# Patient Record
Sex: FEMALE | Race: WHITE | NOT HISPANIC OR LATINO | Employment: FULL TIME | URBAN - METROPOLITAN AREA
[De-identification: names, ages, dates, MRNs, and addresses within clinical notes are randomized per-mention and may not be internally consistent; named-entity substitution may affect disease eponyms.]

---

## 2017-02-08 ENCOUNTER — OFFICE VISIT (OUTPATIENT)
Dept: NEUROSURGERY | Facility: CLINIC | Age: 47
End: 2017-02-08

## 2017-02-08 VITALS
BODY MASS INDEX: 29.71 KG/M2 | HEART RATE: 80 BPM | WEIGHT: 174 LBS | SYSTOLIC BLOOD PRESSURE: 116 MMHG | TEMPERATURE: 98.2 F | DIASTOLIC BLOOD PRESSURE: 80 MMHG | HEIGHT: 64 IN

## 2017-02-08 DIAGNOSIS — M51.04 HNP (HERNIATED NUCLEUS PULPOSUS WITH MYELOPATHY), THORACIC: ICD-10-CM

## 2017-02-08 DIAGNOSIS — M54.50 CHRONIC BILATERAL LOW BACK PAIN WITHOUT SCIATICA: ICD-10-CM

## 2017-02-08 DIAGNOSIS — M51.04 INTERVERTEBRAL THORACIC DISC DISORDER WITH MYELOPATHY, THORACIC REGION: ICD-10-CM

## 2017-02-08 DIAGNOSIS — M48.061 LUMBAR STENOSIS: ICD-10-CM

## 2017-02-08 DIAGNOSIS — G89.29 CHRONIC BILATERAL LOW BACK PAIN WITHOUT SCIATICA: ICD-10-CM

## 2017-02-08 DIAGNOSIS — M54.16 SPINAL STENOSIS OF LUMBAR REGION WITH RADICULOPATHY: ICD-10-CM

## 2017-02-08 DIAGNOSIS — M25.551 RIGHT HIP PAIN: ICD-10-CM

## 2017-02-08 DIAGNOSIS — IMO0002 HNP (HERNIATED NUCLEUS PULPOSUS): ICD-10-CM

## 2017-02-08 DIAGNOSIS — M47.817 LUMBOSACRAL SPONDYLOSIS WITHOUT MYELOPATHY: ICD-10-CM

## 2017-02-08 DIAGNOSIS — M25.551 ARTHRALGIA OF RIGHT HIP: Primary | ICD-10-CM

## 2017-02-08 DIAGNOSIS — M54.16 LUMBAR RADICULOPATHY: ICD-10-CM

## 2017-02-08 DIAGNOSIS — M48.061 SPINAL STENOSIS OF LUMBAR REGION WITH RADICULOPATHY: ICD-10-CM

## 2017-02-08 PROCEDURE — 99213 OFFICE O/P EST LOW 20 MIN: CPT | Performed by: NEUROLOGICAL SURGERY

## 2017-02-08 RX ORDER — GABAPENTIN 300 MG/1
300 CAPSULE ORAL 3 TIMES DAILY
Qty: 90 CAPSULE | Refills: 3 | Status: SHIPPED | OUTPATIENT
Start: 2017-02-08 | End: 2017-03-15

## 2017-02-08 RX ORDER — HYDROCODONE BITARTRATE AND ACETAMINOPHEN 10; 325 MG/1; MG/1
1 TABLET ORAL EVERY 6 HOURS PRN
Qty: 120 TABLET | Refills: 0 | Status: SHIPPED | OUTPATIENT
Start: 2017-02-08 | End: 2017-03-29 | Stop reason: SDUPTHER

## 2017-02-08 NOTE — PROGRESS NOTES
Patient: Xenia Salas  :  1970  Chart #:  [id number]    Date of Service: 17    Chief Complaint:   Chief Complaint   Patient presents with   • Hip Pain   • Back Pain       Hip Pain    The pain is present in the right hip. The quality of the pain is described as stabbing and aching (when it occurs). The pain is at a severity of 5/10. The pain has been improving since onset. Pertinent negatives include no muscle weakness, numbness or tingling. The symptoms are aggravated by movement and weight bearing. She has tried heat and rest for the symptoms. The treatment provided moderate relief.   Back Pain   This is a chronic problem. The current episode started more than 1 month ago. The problem occurs intermittently. The problem has been gradually improving since onset. The quality of the pain is described as stabbing and aching (when it occurs). The pain does not radiate. The pain is at a severity of 5/10. The pain is mild. Pertinent negatives include no abdominal pain, chest pain, dysuria, fever, headaches, numbness, tingling or weakness. She has tried heat, muscle relaxant and NSAIDs for the symptoms. The treatment provided moderate relief.     She has variable intensity pain in the R buttock;  She has little low back pain;  She is exercising regularly; she takes relafen and hydrocodone.    Radiographic Images:   None recently;  Last thoracic MRI done 16.    Past Medical History   Diagnosis Date   • Anxiety and depression    • Arthritis    • Back problem    • Bladder infection    • Disease of thyroid gland    • Inguinal hernia    • Migraine headache    • PONV (postoperative nausea and vomiting)    • Wears glasses    • Worsening headaches      Current Outpatient Prescriptions   Medication Sig Dispense Refill   • Cetirizine HCl (ZYRTEC ALLERGY) 10 MG capsule Take 10 mg by mouth daily.     • DULoxetine (CYMBALTA) 30 MG capsule Take 30 mg by mouth daily.     • HYDROcodone-acetaminophen (NORCO)   MG per tablet Take 1 tablet by mouth Every 6 (Six) Hours As Needed for moderate pain (4-6). 120 tablet 0   • levothyroxine (SYNTHROID, LEVOTHROID) 112 MCG tablet Take 112 mcg by mouth daily.     • nabumetone (RELAFEN) 750 MG tablet Take 1 tablet by mouth 2 (Two) Times a Day. With food 60 tablet 3   • SUMAtriptan (IMITREX) 100 MG tablet Take 100 mg by mouth every 2 (two) hours as needed for migraine.     • ZOLMitriptan (ZOMIG) 5 MG nasal solution 1 spray into each nostril as needed.     • ZOLMitriptan (ZOMIG) 5 MG tablet Take 5 mg by mouth 1 (one) time as needed for migraine.     • gabapentin (NEURONTIN) 300 MG capsule Take 1 capsule by mouth 3 (Three) Times a Day. Start at 1 po QHS week 1. Increase as needed to bid week 2 then tid week 3 as tolerated. 90 capsule 3     No current facility-administered medications for this visit.      Social History     Social History   • Marital status:      Spouse name: N/A   • Number of children: N/A   • Years of education: N/A     Social History Main Topics   • Smoking status: Former Smoker     Packs/day: 1.00     Years: 10.00     Types: Cigarettes     Quit date: 2005   • Smokeless tobacco: Never Used   • Alcohol use Yes      Comment: Moderately   • Drug use: No   • Sexual activity: Not Asked     Other Topics Concern   • None     Social History Narrative     History reviewed. No pertinent family history.  Past Surgical History   Procedure Laterality Date   • Thoracic discectomy  2014     T10-T11 Disc and fusion with cage and plating.   •  section  2001        • Tubal abdominal ligation  2001        • Tonsillectomy  1988   • Bunionectomy  2008   • Thyroidectomy     • Hernia repair     • Umbilical hernia repair     • Lumbar laminectomy discectomy decompression Right 2016     Procedure:  L4-5 LAMINECTOMY ;  Surgeon: Bonifacio Cuellar MD;  Location: [facility];  Service:      Review of Systems   Constitutional: Negative for  "activity change, appetite change, chills, diaphoresis, fatigue, fever and unexpected weight change.   HENT: Negative for congestion, dental problem, drooling, ear discharge, ear pain, facial swelling, hearing loss, mouth sores, nosebleeds, postnasal drip, rhinorrhea, sinus pressure, sneezing, sore throat, tinnitus, trouble swallowing and voice change.    Eyes: Negative for photophobia, pain, discharge, redness, itching and visual disturbance.   Respiratory: Negative for apnea, cough, choking, chest tightness, shortness of breath, wheezing and stridor.    Cardiovascular: Negative for chest pain, palpitations and leg swelling.   Gastrointestinal: Negative for abdominal distention, abdominal pain, anal bleeding, blood in stool, constipation, diarrhea, nausea, rectal pain and vomiting.   Endocrine: Negative for cold intolerance, heat intolerance, polydipsia, polyphagia and polyuria.   Genitourinary: Negative for decreased urine volume, difficulty urinating, dysuria, enuresis, flank pain, frequency, genital sores, hematuria and urgency.   Musculoskeletal: Negative for arthralgias, back pain, gait problem, joint swelling, myalgias, neck pain and neck stiffness.   Skin: Negative for color change, pallor, rash and wound.   Allergic/Immunologic: Negative for environmental allergies, food allergies and immunocompromised state.   Neurological: Negative for dizziness, tingling, tremors, seizures, syncope, facial asymmetry, speech difficulty, weakness, light-headedness, numbness and headaches.   Hematological: Negative for adenopathy. Does not bruise/bleed easily.   Psychiatric/Behavioral: Negative for agitation, behavioral problems, confusion, decreased concentration, dysphoric mood, hallucinations, self-injury, sleep disturbance and suicidal ideas. The patient is not nervous/anxious and is not hyperactive.      Vitals:    02/08/17 0753   BP: 116/80   Pulse: 80   Temp: 98.2 °F (36.8 °C)   Weight: 174 lb (78.9 kg)   Height: 64\" " (162.6 cm)     Physical Exam  Neurologic Exam  Constitutional: She is oriented to person, place, and time. She appears well-developed and well-nourished. No distress.   Neat healthy female    Musculoskeletal:   Lumbar back: She exhibits pain. She exhibits normal range of motion, no tenderness, no deformity and no spasm.   Healed lumbar and thoracic incisions; Minimal stiffness; SLR negative; Hip ROM normal    Mental Status   Oriented to person, place, and time.   Attention: normal. Concentration: normal.   Speech: speech is normal   Level of consciousness: alert  Knowledge: good and consistent with education.   Normal comprehension.      Cranial Nerves   Cranial nerves II through XII intact.      Motor Exam   Muscle bulk: normal  Overall muscle tone: normal     Strength   Strength 5/5 throughout.      Sensory Exam   Right arm light touch: normal  Left arm light touch: normal  Right leg light touch: decreased from knee  Left leg light touch: normal  Proprioception normal.      Gait, Coordination, and Reflexes      Reflexes   Right biceps: 2+  Left biceps: 2+  Right triceps: 2+  Left triceps: 2+  Right patellar: 2+  Left patellar: 2+  Right achilles: 2+  Left achilles: 2+  Right Aguero: absent  Left Aguero: absent  Right ankle clonus: absent  Left ankle clonus: absent  NASIR normal      Xenia was seen today for hip pain and back pain.    Diagnoses and all orders for this visit:    Arthralgia of right hip  -     gabapentin (NEURONTIN) 300 MG capsule; Take 1 capsule by mouth 3 (Three) Times a Day. Start at 1 po QHS week 1. Increase as needed to bid week 2 then tid week 3 as tolerated.    Chronic bilateral low back pain without sciatica  -     gabapentin (NEURONTIN) 300 MG capsule; Take 1 capsule by mouth 3 (Three) Times a Day. Start at 1 po QHS week 1. Increase as needed to bid week 2 then tid week 3 as tolerated.  -     HYDROcodone-acetaminophen (NORCO)  MG per tablet; Take 1 tablet by mouth Every 6 (Six)  Hours As Needed for moderate pain (4-6).    HNP (herniated nucleus pulposus with myelopathy), thoracic  -     HYDROcodone-acetaminophen (NORCO)  MG per tablet; Take 1 tablet by mouth Every 6 (Six) Hours As Needed for moderate pain (4-6).    Spinal stenosis of lumbar region with radiculopathy  -     HYDROcodone-acetaminophen (NORCO)  MG per tablet; Take 1 tablet by mouth Every 6 (Six) Hours As Needed for moderate pain (4-6).    Lumbar radiculopathy  -     HYDROcodone-acetaminophen (NORCO)  MG per tablet; Take 1 tablet by mouth Every 6 (Six) Hours As Needed for moderate pain (4-6).    Right hip pain  -     HYDROcodone-acetaminophen (NORCO)  MG per tablet; Take 1 tablet by mouth Every 6 (Six) Hours As Needed for moderate pain (4-6).    HNP (herniated nucleus pulposus)  -     HYDROcodone-acetaminophen (NORCO)  MG per tablet; Take 1 tablet by mouth Every 6 (Six) Hours As Needed for moderate pain (4-6).    Lumbar stenosis  -     HYDROcodone-acetaminophen (NORCO)  MG per tablet; Take 1 tablet by mouth Every 6 (Six) Hours As Needed for moderate pain (4-6).    Intervertebral thoracic disc disorder with myelopathy, thoracic region  -     HYDROcodone-acetaminophen (NORCO)  MG per tablet; Take 1 tablet by mouth Every 6 (Six) Hours As Needed for moderate pain (4-6).    Lumbosacral spondylosis without myelopathy  -     HYDROcodone-acetaminophen (NORCO)  MG per tablet; Take 1 tablet by mouth Every 6 (Six) Hours As Needed for moderate pain (4-6).        Plan: renew hydrocodone;  Continue relafen;  Add gabapentin for sciatic pain;  Continue exercise;  Return in 4 - 6 weeks for re-evaluation.      Bonifacio Cuellar MD

## 2017-02-15 DIAGNOSIS — M47.817 LUMBOSACRAL SPONDYLOSIS WITHOUT MYELOPATHY: ICD-10-CM

## 2017-02-15 DIAGNOSIS — M54.50 CHRONIC BILATERAL LOW BACK PAIN WITHOUT SCIATICA: ICD-10-CM

## 2017-02-15 DIAGNOSIS — M48.061 LUMBAR STENOSIS: ICD-10-CM

## 2017-02-15 DIAGNOSIS — G89.29 CHRONIC BILATERAL LOW BACK PAIN WITHOUT SCIATICA: ICD-10-CM

## 2017-02-15 DIAGNOSIS — M54.16 SPINAL STENOSIS OF LUMBAR REGION WITH RADICULOPATHY: ICD-10-CM

## 2017-02-15 DIAGNOSIS — M51.04 HNP (HERNIATED NUCLEUS PULPOSUS WITH MYELOPATHY), THORACIC: ICD-10-CM

## 2017-02-15 DIAGNOSIS — IMO0002 HNP (HERNIATED NUCLEUS PULPOSUS): ICD-10-CM

## 2017-02-15 DIAGNOSIS — M25.551 RIGHT HIP PAIN: ICD-10-CM

## 2017-02-15 DIAGNOSIS — M51.04 INTERVERTEBRAL THORACIC DISC DISORDER WITH MYELOPATHY, THORACIC REGION: ICD-10-CM

## 2017-02-15 DIAGNOSIS — M54.16 LUMBAR RADICULOPATHY: ICD-10-CM

## 2017-02-15 DIAGNOSIS — M48.061 SPINAL STENOSIS OF LUMBAR REGION WITH RADICULOPATHY: ICD-10-CM

## 2017-02-15 NOTE — TELEPHONE ENCOUNTER
Provider:  skyler  Caller:     Phone #:    Surgery:  Lami L4-5   Surgery Date:  09/27/16  Last visit:   02/08/17    SAMIA:         Reason for call:         Requesting RF

## 2017-02-16 RX ORDER — NABUMETONE 750 MG/1
750 TABLET, FILM COATED ORAL 2 TIMES DAILY
Qty: 60 TABLET | Refills: 3 | Status: SHIPPED | OUTPATIENT
Start: 2017-02-16 | End: 2017-05-12

## 2017-03-15 ENCOUNTER — OFFICE VISIT (OUTPATIENT)
Dept: NEUROSURGERY | Facility: CLINIC | Age: 47
End: 2017-03-15

## 2017-03-15 VITALS
TEMPERATURE: 97.6 F | HEIGHT: 64 IN | BODY MASS INDEX: 29.19 KG/M2 | SYSTOLIC BLOOD PRESSURE: 106 MMHG | WEIGHT: 171 LBS | DIASTOLIC BLOOD PRESSURE: 74 MMHG | HEART RATE: 76 BPM

## 2017-03-15 DIAGNOSIS — M25.551 RIGHT HIP PAIN: ICD-10-CM

## 2017-03-15 DIAGNOSIS — M54.50 CHRONIC BILATERAL LOW BACK PAIN WITHOUT SCIATICA: Primary | ICD-10-CM

## 2017-03-15 DIAGNOSIS — M51.04 INTERVERTEBRAL THORACIC DISC DISORDER WITH MYELOPATHY, THORACIC REGION: ICD-10-CM

## 2017-03-15 DIAGNOSIS — M54.16 LUMBAR RADICULOPATHY: ICD-10-CM

## 2017-03-15 DIAGNOSIS — G89.29 CHRONIC BILATERAL LOW BACK PAIN WITHOUT SCIATICA: Primary | ICD-10-CM

## 2017-03-15 DIAGNOSIS — M47.817 LUMBOSACRAL SPONDYLOSIS WITHOUT MYELOPATHY: ICD-10-CM

## 2017-03-15 DIAGNOSIS — M51.04 HNP (HERNIATED NUCLEUS PULPOSUS WITH MYELOPATHY), THORACIC: ICD-10-CM

## 2017-03-15 DIAGNOSIS — M54.16 SPINAL STENOSIS OF LUMBAR REGION WITH RADICULOPATHY: ICD-10-CM

## 2017-03-15 DIAGNOSIS — M48.061 SPINAL STENOSIS OF LUMBAR REGION WITH RADICULOPATHY: ICD-10-CM

## 2017-03-15 DIAGNOSIS — IMO0002 HNP (HERNIATED NUCLEUS PULPOSUS): ICD-10-CM

## 2017-03-15 DIAGNOSIS — M25.551 ARTHRALGIA OF RIGHT HIP: ICD-10-CM

## 2017-03-15 DIAGNOSIS — M48.061 LUMBAR STENOSIS: ICD-10-CM

## 2017-03-15 PROCEDURE — 99213 OFFICE O/P EST LOW 20 MIN: CPT | Performed by: NEUROLOGICAL SURGERY

## 2017-03-15 NOTE — PROGRESS NOTES
Patient: Xenia Salas  :  1970  Chart #:  [id number]    Date of Service: 03/15/17    Chief Complaint:   Chief Complaint   Patient presents with   • Back Pain       Back Pain   Chronicity: Patient returns today for a follow-up on her low back pain. Episode onset: On 16 she had a L4-L5 partial laminectomy with bilateral L4-L5 medial facetectomies. The problem occurs intermittently. The pain is present in the gluteal and lumbar spine. The quality of the pain is described as aching. Radiates to: After standing for short periods of time, she has increased pain in her right lower extremity. The pain is at a severity of 4/10 (Patient states she has some numbness in her right lower extremity.  She complains of pain in her buttocks.). The pain is mild (Her pain is mild to moderate.). The symptoms are aggravated by standing (Patient states that her pain increases when she is ambulatory.). Stiffness is present in the morning. Risk factors include lack of exercise (She complains of pain and numbness and pain in her right knee.  She had to stop going to the gymn because of her increased pain.). She has tried bed rest, ice, heat and muscle relaxant (Patient was not able to tolerate Gabapentin.  So this has been discontinued.) for the symptoms. The treatment provided mild relief.     Since her visit 17 she has developed R leg pain and numbness around the R knee in the L4 distribution.  She gets pain and numbness in the R knee area with standing and this subsides when she sits down.  She does not note any weakness.  She has not been able to exercise due to the pain.  She has pain in the R buttock radiating to the anterior thigh, knee and anterior medial leg.  She is not having much back pain.  She takes relafen but did not tolerate gabapentin.      Radiographic Images:   None since surgery 16.    Past Medical History   Diagnosis Date   • Anxiety and depression    • Arthritis    • Back problem    •  Bladder infection    • Disease of thyroid gland    • Inguinal hernia    • Migraine headache    • PONV (postoperative nausea and vomiting)    • Wears glasses    • Worsening headaches      Current Outpatient Prescriptions   Medication Sig Dispense Refill   • Cetirizine HCl (ZYRTEC ALLERGY) 10 MG capsule Take 10 mg by mouth daily.     • DULoxetine (CYMBALTA) 30 MG capsule Take 30 mg by mouth daily.     • HYDROcodone-acetaminophen (NORCO)  MG per tablet Take 1 tablet by mouth Every 6 (Six) Hours As Needed for moderate pain (4-6). 120 tablet 0   • levothyroxine (SYNTHROID, LEVOTHROID) 112 MCG tablet Take 112 mcg by mouth daily.     • nabumetone (RELAFEN) 750 MG tablet Take 1 tablet by mouth 2 (Two) Times a Day. With food 60 tablet 3   • SUMAtriptan (IMITREX) 100 MG tablet Take 100 mg by mouth every 2 (two) hours as needed for migraine.     • ZOLMitriptan (ZOMIG) 5 MG nasal solution 1 spray into each nostril as needed.     • ZOLMitriptan (ZOMIG) 5 MG tablet Take 5 mg by mouth 1 (one) time as needed for migraine.       No current facility-administered medications for this visit.       Allergies   Allergen Reactions   • Penicillins Rash   • Sulfa Antibiotics Rash     Social History     Social History   • Marital status:      Spouse name: N/A   • Number of children: N/A   • Years of education: N/A     Social History Main Topics   • Smoking status: Former Smoker     Packs/day: 1.00     Years: 10.00     Types: Cigarettes     Quit date: 2005   • Smokeless tobacco: Never Used   • Alcohol use Yes      Comment: Moderately   • Drug use: No   • Sexual activity: Not Asked     Other Topics Concern   • None     Social History Narrative     No family history on file.  Past Surgical History   Procedure Laterality Date   • Thoracic discectomy  2014     T10-T11 Disc and fusion with cage and plating.   •  section  2001        • Tubal abdominal ligation  2001        • Tonsillectomy  1988  "  • Bunionectomy  01/01/2008   • Thyroidectomy     • Hernia repair     • Umbilical hernia repair     • Lumbar laminectomy discectomy decompression Right 9/27/2016     Procedure:  L4-5 LAMINECTOMY ;  Surgeon: Bonifacio Cuellar MD;  Location: [facility];  Service:      Review of Systems   Musculoskeletal: Positive for back pain.   All other systems reviewed and are negative.    Vitals:    03/15/17 0813   BP: 106/74   BP Location: Right arm   Patient Position: Sitting   Cuff Size: Adult   Pulse: 76   Temp: 97.6 °F (36.4 °C)   TempSrc: Temporal Artery    Weight: 171 lb (77.6 kg)   Height: 64\" (162.6 cm)     Physical Exam  Neurologic Exam  Constitutional: She is oriented to person, place, and time. She appears well-developed and well-nourished. No distress.   Neat healthy female    Musculoskeletal:   Lumbar back: She exhibits pain. She exhibits normal range of motion, no tenderness, no deformity and no spasm.   Healed lumbar and thoracic incisions; Minimal stiffness; SLR negative; Hip ROM normal      Mental Status   Oriented to person, place, and time.   Attention: normal. Concentration: normal.   Speech: speech is normal   Level of consciousness: alert  Knowledge: good and consistent with education.   Normal comprehension.       Cranial Nerves   Cranial nerves II through XII intact.       Motor Exam   Muscle bulk: normal  Overall muscle tone: normal      Strength   Strength 5/5 throughout.       Sensory Exam   Right arm light touch: normal  Left arm light touch: normal  Right leg light touch: decreased from knee, especially anterior medial leg  Left leg light touch: normal  Proprioception normal.       Gait, Coordination, and Reflexes       Gait: normal    Reflexes   Right biceps: 2+  Left biceps: 2+  Right triceps: 2+  Left triceps: 2+  Right patellar: 1+  Left patellar: 2+  Right achilles: 2+  Left achilles: 2+  Right Aguero: absent  Left Aguero: absent  Right ankle clonus: absent  Left ankle clonus: absent  NASIR normal "         Xenia was seen today for back pain.    Diagnoses and all orders for this visit:    Chronic bilateral low back pain without sciatica  -     MRI Lumbar Spine With & Without Contrast; Future    HNP (herniated nucleus pulposus)  -     MRI Lumbar Spine With & Without Contrast; Future    HNP (herniated nucleus pulposus with myelopathy), thoracic  -     MRI Lumbar Spine With & Without Contrast; Future    Lumbar stenosis  -     MRI Lumbar Spine With & Without Contrast; Future    Spinal stenosis of lumbar region with radiculopathy  -     MRI Lumbar Spine With & Without Contrast; Future    Intervertebral thoracic disc disorder with myelopathy, thoracic region  -     MRI Lumbar Spine With & Without Contrast; Future    Lumbar radiculopathy  -     MRI Lumbar Spine With & Without Contrast; Future    Lumbosacral spondylosis without myelopathy  -     MRI Lumbar Spine With & Without Contrast; Future    Right hip pain  -     MRI Lumbar Spine With & Without Contrast; Future    Arthralgia of right hip  -     MRI Lumbar Spine With & Without Contrast; Future      Plan: I ordered lumbar spine MRI wo/w contrast to examine the L4L5 disc and neuroforamen as I think her radicular symptoms are due to L4 nerve root compression in the neuroforamen.  I will see her back and make further recommendations after reviewing the MRI.  She is to continue current medications.    I, Dr. Bonifacio Cuellar, personally performed the services described in the documentation as scribed in my presence, and the documentation is both accurate and complete.    Bonifacio Cuellar MD   Scribed for Bonifacio Cuellar MD by Portia Condon, OMARI @. 3/15/2017  8:38 AM

## 2017-03-29 DIAGNOSIS — M54.16 SPINAL STENOSIS OF LUMBAR REGION WITH RADICULOPATHY: ICD-10-CM

## 2017-03-29 DIAGNOSIS — M51.04 HNP (HERNIATED NUCLEUS PULPOSUS WITH MYELOPATHY), THORACIC: ICD-10-CM

## 2017-03-29 DIAGNOSIS — M48.061 LUMBAR STENOSIS: ICD-10-CM

## 2017-03-29 DIAGNOSIS — M47.817 LUMBOSACRAL SPONDYLOSIS WITHOUT MYELOPATHY: ICD-10-CM

## 2017-03-29 DIAGNOSIS — M54.50 CHRONIC BILATERAL LOW BACK PAIN WITHOUT SCIATICA: ICD-10-CM

## 2017-03-29 DIAGNOSIS — IMO0002 HNP (HERNIATED NUCLEUS PULPOSUS): ICD-10-CM

## 2017-03-29 DIAGNOSIS — M54.16 LUMBAR RADICULOPATHY: ICD-10-CM

## 2017-03-29 DIAGNOSIS — M48.061 SPINAL STENOSIS OF LUMBAR REGION WITH RADICULOPATHY: ICD-10-CM

## 2017-03-29 DIAGNOSIS — M51.04 INTERVERTEBRAL THORACIC DISC DISORDER WITH MYELOPATHY, THORACIC REGION: ICD-10-CM

## 2017-03-29 DIAGNOSIS — M25.551 RIGHT HIP PAIN: ICD-10-CM

## 2017-03-29 DIAGNOSIS — G89.29 CHRONIC BILATERAL LOW BACK PAIN WITHOUT SCIATICA: ICD-10-CM

## 2017-03-29 RX ORDER — HYDROCODONE BITARTRATE AND ACETAMINOPHEN 10; 325 MG/1; MG/1
1 TABLET ORAL EVERY 6 HOURS PRN
Qty: 120 TABLET | Refills: 0 | Status: CANCELLED | OUTPATIENT
Start: 2017-03-29

## 2017-03-29 RX ORDER — HYDROCODONE BITARTRATE AND ACETAMINOPHEN 10; 325 MG/1; MG/1
1 TABLET ORAL EVERY 6 HOURS PRN
Qty: 120 TABLET | Refills: 0 | Status: SHIPPED | OUTPATIENT
Start: 2017-03-29 | End: 2017-05-05 | Stop reason: SDUPTHER

## 2017-03-29 NOTE — TELEPHONE ENCOUNTER
Provider:  Polo  Caller: Xenia  Time of call:     Phone #:  [phone]  Surgery:  L4-5 Laminectomy  Surgery Date: 9/27/16   Last visit:   3/15/17  Next visit: 4/6/17    SAMIA:       02/15/2017 Hydrocodone/Acetaminophen  325MG/10MG  1970 [address] [facility] [phone]  [facility] 40 1    Reason for call:       Hydrocodone refill request

## 2017-03-30 NOTE — TELEPHONE ENCOUNTER
RX signed, called pt, left vm adving it was ready and to call us back if she would like it mailed. RX in [facility] office

## 2017-04-03 ENCOUNTER — HOSPITAL ENCOUNTER (OUTPATIENT)
Dept: MRI IMAGING | Facility: HOSPITAL | Age: 47
Discharge: HOME | End: 2017-04-03
Attending: NEUROLOGICAL SURGERY | Admitting: NEUROLOGICAL SURGERY

## 2017-04-03 DIAGNOSIS — M47.817 LUMBOSACRAL SPONDYLOSIS WITHOUT MYELOPATHY: ICD-10-CM

## 2017-04-03 DIAGNOSIS — M25.551 RIGHT HIP PAIN: ICD-10-CM

## 2017-04-03 DIAGNOSIS — M54.16 LUMBAR RADICULOPATHY: ICD-10-CM

## 2017-04-03 DIAGNOSIS — M51.04 HNP (HERNIATED NUCLEUS PULPOSUS WITH MYELOPATHY), THORACIC: ICD-10-CM

## 2017-04-03 DIAGNOSIS — G89.29 CHRONIC BILATERAL LOW BACK PAIN WITHOUT SCIATICA: ICD-10-CM

## 2017-04-03 DIAGNOSIS — IMO0002 HNP (HERNIATED NUCLEUS PULPOSUS): ICD-10-CM

## 2017-04-03 DIAGNOSIS — M54.50 CHRONIC BILATERAL LOW BACK PAIN WITHOUT SCIATICA: ICD-10-CM

## 2017-04-03 DIAGNOSIS — M51.04 INTERVERTEBRAL THORACIC DISC DISORDER WITH MYELOPATHY, THORACIC REGION: ICD-10-CM

## 2017-04-03 DIAGNOSIS — M48.061 SPINAL STENOSIS OF LUMBAR REGION WITH RADICULOPATHY: ICD-10-CM

## 2017-04-03 DIAGNOSIS — M25.551 ARTHRALGIA OF RIGHT HIP: ICD-10-CM

## 2017-04-03 DIAGNOSIS — M54.16 SPINAL STENOSIS OF LUMBAR REGION WITH RADICULOPATHY: ICD-10-CM

## 2017-04-03 DIAGNOSIS — M48.061 LUMBAR STENOSIS: ICD-10-CM

## 2017-04-03 PROCEDURE — 0 GADOBENATE DIMEGLUMINE 529 MG/ML SOLUTION: Performed by: NEUROLOGICAL SURGERY

## 2017-04-03 PROCEDURE — A9577 INJ MULTIHANCE: HCPCS | Performed by: NEUROLOGICAL SURGERY

## 2017-04-03 PROCEDURE — 72158 MRI LUMBAR SPINE W/O & W/DYE: CPT

## 2017-04-03 RX ADMIN — GADOBENATE DIMEGLUMINE 15 ML: 529 INJECTION, SOLUTION INTRAVENOUS at 14:45

## 2017-05-05 DIAGNOSIS — M54.16 SPINAL STENOSIS OF LUMBAR REGION WITH RADICULOPATHY: ICD-10-CM

## 2017-05-05 DIAGNOSIS — M54.16 LUMBAR RADICULOPATHY: ICD-10-CM

## 2017-05-05 DIAGNOSIS — M47.817 LUMBOSACRAL SPONDYLOSIS WITHOUT MYELOPATHY: ICD-10-CM

## 2017-05-05 DIAGNOSIS — M25.551 RIGHT HIP PAIN: ICD-10-CM

## 2017-05-05 DIAGNOSIS — IMO0002 HNP (HERNIATED NUCLEUS PULPOSUS): ICD-10-CM

## 2017-05-05 DIAGNOSIS — M51.04 INTERVERTEBRAL THORACIC DISC DISORDER WITH MYELOPATHY, THORACIC REGION: ICD-10-CM

## 2017-05-05 DIAGNOSIS — M48.061 SPINAL STENOSIS OF LUMBAR REGION WITH RADICULOPATHY: ICD-10-CM

## 2017-05-05 DIAGNOSIS — M51.04 HNP (HERNIATED NUCLEUS PULPOSUS WITH MYELOPATHY), THORACIC: ICD-10-CM

## 2017-05-05 DIAGNOSIS — M54.50 CHRONIC BILATERAL LOW BACK PAIN WITHOUT SCIATICA: ICD-10-CM

## 2017-05-05 DIAGNOSIS — M48.061 LUMBAR STENOSIS: ICD-10-CM

## 2017-05-05 DIAGNOSIS — G89.29 CHRONIC BILATERAL LOW BACK PAIN WITHOUT SCIATICA: ICD-10-CM

## 2017-05-08 RX ORDER — HYDROCODONE BITARTRATE AND ACETAMINOPHEN 10; 325 MG/1; MG/1
1 TABLET ORAL EVERY 6 HOURS PRN
Qty: 120 TABLET | Refills: 0 | Status: SHIPPED | OUTPATIENT
Start: 2017-05-08 | End: 2017-06-14 | Stop reason: SDUPTHER

## 2017-05-12 ENCOUNTER — OFFICE VISIT (OUTPATIENT)
Dept: NEUROSURGERY | Facility: CLINIC | Age: 47
End: 2017-05-12

## 2017-05-12 VITALS
HEART RATE: 72 BPM | HEIGHT: 64 IN | OXYGEN SATURATION: 98 % | BODY MASS INDEX: 28.68 KG/M2 | WEIGHT: 168 LBS | TEMPERATURE: 97.7 F | SYSTOLIC BLOOD PRESSURE: 116 MMHG | DIASTOLIC BLOOD PRESSURE: 80 MMHG

## 2017-05-12 DIAGNOSIS — M51.04 INTERVERTEBRAL THORACIC DISC DISORDER WITH MYELOPATHY, THORACIC REGION: ICD-10-CM

## 2017-05-12 DIAGNOSIS — M54.16 SPINAL STENOSIS OF LUMBAR REGION WITH RADICULOPATHY: ICD-10-CM

## 2017-05-12 DIAGNOSIS — M48.061 LUMBAR STENOSIS: ICD-10-CM

## 2017-05-12 DIAGNOSIS — M25.551 RIGHT HIP PAIN: ICD-10-CM

## 2017-05-12 DIAGNOSIS — M54.16 LUMBAR RADICULOPATHY: ICD-10-CM

## 2017-05-12 DIAGNOSIS — M51.04 HNP (HERNIATED NUCLEUS PULPOSUS WITH MYELOPATHY), THORACIC: ICD-10-CM

## 2017-05-12 DIAGNOSIS — M54.50 CHRONIC BILATERAL LOW BACK PAIN WITHOUT SCIATICA: Primary | ICD-10-CM

## 2017-05-12 DIAGNOSIS — M25.551 ARTHRALGIA OF RIGHT HIP: ICD-10-CM

## 2017-05-12 DIAGNOSIS — M48.061 SPINAL STENOSIS OF LUMBAR REGION WITH RADICULOPATHY: ICD-10-CM

## 2017-05-12 DIAGNOSIS — G89.29 CHRONIC BILATERAL LOW BACK PAIN WITHOUT SCIATICA: Primary | ICD-10-CM

## 2017-05-12 DIAGNOSIS — M47.817 LUMBOSACRAL SPONDYLOSIS WITHOUT MYELOPATHY: ICD-10-CM

## 2017-05-12 DIAGNOSIS — IMO0002 HNP (HERNIATED NUCLEUS PULPOSUS): ICD-10-CM

## 2017-05-12 PROCEDURE — 99213 OFFICE O/P EST LOW 20 MIN: CPT | Performed by: NEUROLOGICAL SURGERY

## 2017-05-12 RX ORDER — ASPIRIN 325 MG/1
TABLET, FILM COATED ORAL DAILY
COMMUNITY

## 2017-05-12 RX ORDER — CELECOXIB 200 MG/1
200 CAPSULE ORAL 2 TIMES DAILY
COMMUNITY

## 2017-05-12 RX ORDER — LEVOTHYROXINE SODIUM 0.1 MG/1
TABLET ORAL
COMMUNITY
Start: 2017-05-04

## 2017-05-12 RX ORDER — ASPIRIN 81 MG/1
81 TABLET ORAL DAILY
COMMUNITY
End: 2017-05-18

## 2017-05-16 ENCOUNTER — TELEPHONE (OUTPATIENT)
Dept: PAIN MEDICINE | Facility: CLINIC | Age: 47
End: 2017-05-16

## 2017-05-18 ENCOUNTER — OFFICE VISIT (OUTPATIENT)
Dept: PAIN MEDICINE | Facility: CLINIC | Age: 47
End: 2017-05-18

## 2017-05-18 VITALS
BODY MASS INDEX: 29.02 KG/M2 | WEIGHT: 170 LBS | HEART RATE: 69 BPM | RESPIRATION RATE: 16 BRPM | SYSTOLIC BLOOD PRESSURE: 104 MMHG | TEMPERATURE: 98.7 F | DIASTOLIC BLOOD PRESSURE: 74 MMHG | HEIGHT: 64 IN | OXYGEN SATURATION: 96 %

## 2017-05-18 DIAGNOSIS — M48.061 SPINAL STENOSIS OF LUMBAR REGION WITH RADICULOPATHY: ICD-10-CM

## 2017-05-18 DIAGNOSIS — M48.061 SPINAL STENOSIS OF LUMBAR REGION WITH RADICULOPATHY: Primary | ICD-10-CM

## 2017-05-18 DIAGNOSIS — M54.16 SPINAL STENOSIS OF LUMBAR REGION WITH RADICULOPATHY: ICD-10-CM

## 2017-05-18 DIAGNOSIS — F32.A ANXIETY AND DEPRESSION: ICD-10-CM

## 2017-05-18 DIAGNOSIS — M54.16 SPINAL STENOSIS OF LUMBAR REGION WITH RADICULOPATHY: Primary | ICD-10-CM

## 2017-05-18 DIAGNOSIS — G43.001 MIGRAINE WITHOUT AURA AND WITH STATUS MIGRAINOSUS, NOT INTRACTABLE: ICD-10-CM

## 2017-05-18 DIAGNOSIS — F41.9 ANXIETY AND DEPRESSION: ICD-10-CM

## 2017-05-18 PROCEDURE — 99203 OFFICE O/P NEW LOW 30 MIN: CPT | Performed by: ANESTHESIOLOGY

## 2017-05-18 RX ORDER — GABAPENTIN 100 MG/1
100 CAPSULE ORAL 4 TIMES DAILY
Qty: 120 CAPSULE | Refills: 2 | Status: SHIPPED | OUTPATIENT
Start: 2017-05-18

## 2017-06-13 ENCOUNTER — PROCEDURE VISIT (OUTPATIENT)
Dept: NEUROLOGY | Facility: CLINIC | Age: 47
End: 2017-06-13

## 2017-06-13 DIAGNOSIS — R20.2 NUMBNESS AND TINGLING: Primary | ICD-10-CM

## 2017-06-13 DIAGNOSIS — R20.0 NUMBNESS AND TINGLING: Primary | ICD-10-CM

## 2017-06-13 PROCEDURE — 95886 MUSC TEST DONE W/N TEST COMP: CPT | Performed by: PSYCHIATRY & NEUROLOGY

## 2017-06-13 PROCEDURE — 95911 NRV CNDJ TEST 9-10 STUDIES: CPT | Performed by: PSYCHIATRY & NEUROLOGY

## 2017-06-13 NOTE — PROGRESS NOTES
"Procedure   EMG & Nerve Conduction Test  Date/Time: 6/13/2017 8:23 AM  Performed by: JOSSIE GALEANO  Authorized by: JOSSIE GALEANO   Consent: Verbal consent obtained.            [facility]   [address], [address]  [place]  [address]   Phone: [phone]  FAX: [phone]           Patient: tennille thomas YOB: 1970  Gender: Female  Reason for study: see below in history section      Visit Date: 6/13/2017 08:25  Age: 47 Years 5 Months Old  Examining Physician: Larissa       The patient has a chief complaint and history of bilateral, lower extremity, numbness,    The patient is referred to have this problem evaluated today with EMG and nerve conduction studies.  The performing physician also acted as a technician on this study.  Please note that in the table \"NR\" stands for \"no response\" therefore testing was performed, but no response was elicited.    A brief neurological exam, revealed no abnormalities in muscle bulk strength reflexes and sensation.      Sensory NCS      Nerve / Sites Rec. Site Distance Onset Lat NP Amp Onset [place]     cm ms µV m/s   L Sural - Ankle (Calf)      Calf Ankle 14 2.8 4.5 48   R Sural - Ankle (Calf)      Calf Ankle 14 3.3 4.3 43   L Superficial peroneal - Ankle      Lat leg Ankle 8 1.5 4.1 55       Motor NCS      Nerve / Sites Muscle Distance Latency Amplitude Velocity     cm ms mV m/s   L Peroneal - EDB      Ankle EDB 8 4.06 3.3       Fib head EDB 30 10.47 3.6 47   R Peroneal - EDB      Ankle EDB 8 4.38 4.2       Fib head EDB 30 9.95 4.1 54   L Tibial - AH      Ankle AH 8 3.07 12.4       Pop fossa AH 43 14.06 12.2 39   R Tibial - AH      Ankle AH 8 5.78 5.2       Pop fossa AH 43 13.65 5.1 55       F  Wave      Nerve F-min    ms   L Tibial - AH 50   L Peroneal - EDB 25   R Peroneal - EDB 45   R Tibial - AH 51       H Reflex      Nerve H Lat    ms   L Tibial - [place] 26.7   R Tibial - Sole[place] 28.1       EMG Summary Table     Spontaneous " MUAP Recruitment    IA Fib PSW Fasc H.F. Amp Dur. PPP Pattern   L. GASTROCN (MED) N None None None None N N N N   L. TIB ANTERIOR N None None None None N N N N   L. ILIOPSOAS N None None None None N N N N   L. QUADRICEPS N None None None None N N N N   L. GLUTEUS MAX N None None None None N N N N   L. L.PARASPINALS N None None None None N N N N   R. L.PARASPINALS N None None None None N N N N   R. GASTROCN (MED) N None None None None N N N N   R. TIB ANTERIOR N None None None None N N N N   R. ILIOPSOAS N None None None None N N N N   R. QUADRICEPS N None None None None N N N N   R. GLUTEUS MAX N None None None None N N N N     1.) SNAPs and NCVs were normal  2.) CMAPs and NCVs were normal  3.) F-waves were normal in persistence and minimal latency.  4.) H-reflexes were normal  5.) EMGs were normal in all muscles tested.    There is currently no electrodiagnostic evidence of a neuromuscular disease.    All NCS were performed at 32C or greater.    The referring health care provider will discuss this report and possible further diagnostic and management options with the patient.      Jarrod Dias M.D.                       Diagnoses and all orders for this visit:    Numbness and tingling  -     EMG & Nerve Conduction Test

## 2017-06-14 ENCOUNTER — TELEPHONE (OUTPATIENT)
Dept: PAIN MEDICINE | Facility: CLINIC | Age: 47
End: 2017-06-14

## 2017-06-14 ENCOUNTER — OUTSIDE FACILITY SERVICE (OUTPATIENT)
Dept: PAIN MEDICINE | Facility: CLINIC | Age: 47
End: 2017-06-14

## 2017-06-14 DIAGNOSIS — IMO0002 HNP (HERNIATED NUCLEUS PULPOSUS): ICD-10-CM

## 2017-06-14 DIAGNOSIS — M51.04 HNP (HERNIATED NUCLEUS PULPOSUS WITH MYELOPATHY), THORACIC: ICD-10-CM

## 2017-06-14 DIAGNOSIS — M54.16 SPINAL STENOSIS OF LUMBAR REGION WITH RADICULOPATHY: ICD-10-CM

## 2017-06-14 DIAGNOSIS — M48.061 LUMBAR STENOSIS: ICD-10-CM

## 2017-06-14 DIAGNOSIS — M25.551 RIGHT HIP PAIN: ICD-10-CM

## 2017-06-14 DIAGNOSIS — M51.04 INTERVERTEBRAL THORACIC DISC DISORDER WITH MYELOPATHY, THORACIC REGION: ICD-10-CM

## 2017-06-14 DIAGNOSIS — M48.061 SPINAL STENOSIS OF LUMBAR REGION WITH RADICULOPATHY: ICD-10-CM

## 2017-06-14 DIAGNOSIS — M47.817 LUMBOSACRAL SPONDYLOSIS WITHOUT MYELOPATHY: ICD-10-CM

## 2017-06-14 DIAGNOSIS — M54.16 LUMBAR RADICULOPATHY: ICD-10-CM

## 2017-06-14 DIAGNOSIS — G89.29 CHRONIC BILATERAL LOW BACK PAIN WITHOUT SCIATICA: ICD-10-CM

## 2017-06-14 DIAGNOSIS — M54.50 CHRONIC BILATERAL LOW BACK PAIN WITHOUT SCIATICA: ICD-10-CM

## 2017-06-14 PROCEDURE — 99152 MOD SED SAME PHYS/QHP 5/>YRS: CPT | Performed by: ANESTHESIOLOGY

## 2017-06-14 PROCEDURE — 64483 NJX AA&/STRD TFRM EPI L/S 1: CPT | Performed by: ANESTHESIOLOGY

## 2017-06-14 RX ORDER — HYDROCODONE BITARTRATE AND ACETAMINOPHEN 10; 325 MG/1; MG/1
1 TABLET ORAL EVERY 6 HOURS PRN
Qty: 120 TABLET | Refills: 0 | Status: SHIPPED | OUTPATIENT
Start: 2017-06-14

## 2017-06-14 NOTE — TELEPHONE ENCOUNTER
Provider:  Polo  Caller: Xenia  Time of call:     Phone #:    Surgery:  L4-5 Lami  Surgery Date:  9/27/16  Last visit:   5/12/17  Next visit: n/a    SAMIA:       05/10/2017 Hydrocodone/Acetaminophen  325MG/10MG  1970 [address] [facility] [phone]  [facility] 40 1    Reason for call:         Hydrocodone refill request

## 2017-06-14 NOTE — TELEPHONE ENCOUNTER
Spoke with patient regarding EMG results. Patient was made aware that the EMG was normal and there is no electrodiagnostic evidence of a neuromuscular disease.

## 2017-07-17 DIAGNOSIS — M54.50 CHRONIC BILATERAL LOW BACK PAIN WITHOUT SCIATICA: ICD-10-CM

## 2017-07-17 DIAGNOSIS — M51.04 INTERVERTEBRAL THORACIC DISC DISORDER WITH MYELOPATHY, THORACIC REGION: ICD-10-CM

## 2017-07-17 DIAGNOSIS — G89.29 CHRONIC BILATERAL LOW BACK PAIN WITHOUT SCIATICA: ICD-10-CM

## 2017-07-17 DIAGNOSIS — IMO0002 HNP (HERNIATED NUCLEUS PULPOSUS): ICD-10-CM

## 2017-07-17 DIAGNOSIS — M47.817 LUMBOSACRAL SPONDYLOSIS WITHOUT MYELOPATHY: ICD-10-CM

## 2017-07-17 DIAGNOSIS — M48.061 LUMBAR STENOSIS: ICD-10-CM

## 2017-07-17 DIAGNOSIS — M51.04 HNP (HERNIATED NUCLEUS PULPOSUS WITH MYELOPATHY), THORACIC: ICD-10-CM

## 2017-07-17 DIAGNOSIS — M25.551 RIGHT HIP PAIN: ICD-10-CM

## 2017-07-17 DIAGNOSIS — M54.16 SPINAL STENOSIS OF LUMBAR REGION WITH RADICULOPATHY: ICD-10-CM

## 2017-07-17 DIAGNOSIS — M48.061 SPINAL STENOSIS OF LUMBAR REGION WITH RADICULOPATHY: ICD-10-CM

## 2017-07-17 DIAGNOSIS — M54.16 LUMBAR RADICULOPATHY: ICD-10-CM

## 2017-07-17 RX ORDER — HYDROCODONE BITARTRATE AND ACETAMINOPHEN 10; 325 MG/1; MG/1
1 TABLET ORAL EVERY 6 HOURS PRN
Qty: 45 TABLET | Refills: 0 | OUTPATIENT
Start: 2017-07-17

## 2017-07-17 NOTE — TELEPHONE ENCOUNTER
I spoke with Mrs. Salas and she said that she did have a steroid injection with Dr. Carmichael last month and it was not effective.  She states that her pain is in the right buttock most of the time.  It does not go into the lower extremities.  At times she will have left sided buttock pain, but usually it's the right.  Her pain increases when she is ambulatory.  She still has some numbness in the right knee. She does not have an apt scheduled with Dr. Carmichael as of yet.  She states that she needs to make an apt, however she is not sure what he will do since the injection did not work.

## 2017-07-17 NOTE — TELEPHONE ENCOUNTER
She was seen 4 weeks ago and received an JOSE that did not seem to help. She does not have a f/u scheduled yet.

## 2017-07-17 NOTE — TELEPHONE ENCOUNTER
Provider: Polo  Caller: Xenia  Time of call: 922  Phone #:   Surgery: L4-5 Lami  Surgery Date: 9/27/16  Last visit: 5/12/17  Next visit: n/a    SAMIA:         03/31/2017 Hydrocodone/Acetaminophen  325MG/10MG  1970 [address] Jose Scales [facility] [address]  [phone]  [facility] 40 1  05/10/2017 Hydrocodone/Acetaminophen  325MG/10MG  1970 [address] [facility]   [phone]  [facility] 40 1  06/16/2017 Hydrocodone/Acetaminophen  325MG/10MG  1970 [address] Bonifacio Cuellar [facility]  [phone]  [facility] 40 1    Reason for call:         Refill request

## 2017-12-15 ENCOUNTER — TELEPHONE (OUTPATIENT)
Dept: NEUROSURGERY | Facility: CLINIC | Age: 47
End: 2017-12-15

## 2017-12-15 NOTE — TELEPHONE ENCOUNTER
Spoke with pt.  She verbalized understanding.  We discussed the avoidance of axial loading and proper body mechanics.  She states she is working with a  at this time and just wants to be sure they understand what she should avoid based on her history.  She is going to request a copy of her records and said she does not need an order for formal physical therapy.  Pt verbalized understanding and was satisfied.  I transferred the call to medical records for records request.

## 2017-12-15 NOTE — TELEPHONE ENCOUNTER
We have no restrictions with regard to her exercise routine at this point.  She should be sure to use good body mechanics when lifting or doing any exercise in the gym.  If a particular exercise exacerbates her pain, this exercise should be avoided.  If she would like more formal guidance we can refer her to formal physical therapy.

## 2017-12-15 NOTE — TELEPHONE ENCOUNTER
Provider:  Pool  Caller: milan  Time of call:   01:14pm  Phone #:  [phone]  Surgery:  L4/5 LAMI  Surgery Date:  09/27/16  Last visit:   05/12/17  Next visit: NA    Reason for call:         Pt left msg requesting either a follow up appt or a letter with recommendations about what she can and can not do as far as exercising.  Please advise any limitations.

## 2018-05-29 ENCOUNTER — TRANSCRIBE ORDERS (OUTPATIENT)
Dept: PAIN MEDICINE | Facility: CLINIC | Age: 48
End: 2018-05-29

## 2018-05-29 DIAGNOSIS — M54.5 CHRONIC LOW BACK PAIN, UNSPECIFIED BACK PAIN LATERALITY, WITH SCIATICA PRESENCE UNSPECIFIED: Primary | ICD-10-CM

## 2018-05-29 DIAGNOSIS — G89.29 CHRONIC LOW BACK PAIN, UNSPECIFIED BACK PAIN LATERALITY, WITH SCIATICA PRESENCE UNSPECIFIED: Primary | ICD-10-CM

## 2018-06-15 ENCOUNTER — TELEPHONE (OUTPATIENT)
Dept: PAIN MEDICINE | Facility: CLINIC | Age: 48
End: 2018-06-15

## 2018-07-16 ENCOUNTER — TELEPHONE (OUTPATIENT)
Dept: NEUROSURGERY | Facility: CLINIC | Age: 48
End: 2018-07-16

## 2018-07-16 NOTE — TELEPHONE ENCOUNTER
Provider:  [facility]  Caller: Xenia  Phone #:  [phone]  Surgery:  L4-5 Laminectomy  Surgery Date:  9/27/16  Last visit:   5/12/17  Next visit: n/a    SAMIA:         Reason for call:           See ray note below

## 2018-07-16 NOTE — TELEPHONE ENCOUNTER
Pt called to get another appointment with Dr. Cuellar.  She said that this visit was to assess the calcification in her thoracic spine and that she was supposed to follow up with [facility] yearly to monitor it.  Looks like her last scan was done roughly 2 years ago.  Does she need to get a new scan and be seen?    She also reports that her low back pain has mostly gone at this point.

## 2018-07-16 NOTE — TELEPHONE ENCOUNTER
I do not see that he recommended annual scans. If she is not having symptoms, no need to get a scan

## 2023-12-28 ENCOUNTER — OFFICE VISIT (OUTPATIENT)
Dept: CARDIOLOGY | Facility: CLINIC | Age: 53
End: 2023-12-28
Payer: COMMERCIAL

## 2023-12-28 VITALS
WEIGHT: 173 LBS | HEIGHT: 64 IN | DIASTOLIC BLOOD PRESSURE: 90 MMHG | HEART RATE: 96 BPM | BODY MASS INDEX: 29.53 KG/M2 | OXYGEN SATURATION: 97 % | SYSTOLIC BLOOD PRESSURE: 136 MMHG

## 2023-12-28 DIAGNOSIS — I10 HYPERTENSION, ESSENTIAL: ICD-10-CM

## 2023-12-28 DIAGNOSIS — I47.10 SUSTAINED SVT: Primary | ICD-10-CM

## 2023-12-28 PROCEDURE — 99204 OFFICE O/P NEW MOD 45 MIN: CPT | Performed by: INTERNAL MEDICINE

## 2023-12-28 PROCEDURE — 93000 ELECTROCARDIOGRAM COMPLETE: CPT | Performed by: INTERNAL MEDICINE

## 2023-12-28 RX ORDER — DEXTROAMPHETAMINE SULFATE, DEXTROAMPHETAMINE SACCHARATE, AMPHETAMINE SULFATE AND AMPHETAMINE ASPARTATE 6.25; 6.25; 6.25; 6.25 MG/1; MG/1; MG/1; MG/1
CAPSULE, EXTENDED RELEASE ORAL
COMMUNITY
Start: 2021-08-01

## 2023-12-28 RX ORDER — LEVOTHYROXINE SODIUM 0.12 MG/1
125 TABLET ORAL
COMMUNITY
Start: 2023-12-21 | End: 2024-12-20

## 2023-12-28 RX ORDER — DULOXETIN HYDROCHLORIDE 60 MG/1
CAPSULE, DELAYED RELEASE ORAL
COMMUNITY
Start: 2023-11-16

## 2023-12-28 RX ORDER — ROSUVASTATIN CALCIUM 10 MG/1
TABLET, COATED ORAL
COMMUNITY
Start: 2023-08-13

## 2023-12-28 RX ORDER — DICLOFENAC SODIUM 75 MG/1
TABLET, DELAYED RELEASE ORAL
COMMUNITY

## 2023-12-28 NOTE — PROGRESS NOTES
[facility] Provider Note     Date:   2023   Name: Xenia Salas  :   1970  PCP: Simon Cotter MD    Chief Complaint   Patient presents with    Establish Care       Subjective     History of Present Illness  Xenia Salas is a 53 y.o. female who presents today for follow-up after ER visit.  She reports the following:  Night before ER went to bed and felt indigestion without eating much. Woke up at 4-5am and realized HR was 140bpm on apple watch. Has happened before on/pff, usually only 15min. This kept going past 15min. Tried to get up but felt dizzy and lightheaded. ER showed SVT at 155bpm. Resolved spontaneously. WBC elevated but other wise labs OK. They started metoprolol, sent home. She felt a little weird at home on that, and went to get a test for UTI and it was positive. Also getting thyroid  regulated. Several thyroid growths removed. Since having that surgery she had a lot less of the 15min type of episodes until this latest one.   Has been diagnosed with atrial flutter in the past by us and had echo and stress and monitor. About 5 yrs  ago. No recent CP/SOA otherwise.     No specialty comments available.    Allergies   Allergen Reactions    Penicillins Rash    Sulfa Antibiotics Rash       Current Outpatient Medications:     Adderall XR 25 MG 24 hr capsule, , Disp: , Rfl:     Cetirizine HCl (ZYRTEC ALLERGY) 10 MG capsule, Take 10 mg by mouth daily., Disp: , Rfl:     diclofenac (VOLTAREN) 75 MG EC tablet, TAKE 1 TABLET BY MOUTH 1 TO 2 TIMES A DAY, Disp: , Rfl:     DULoxetine (CYMBALTA) 60 MG capsule, , Disp: , Rfl:     levothyroxine (SYNTHROID, LEVOTHROID) 125 MCG tablet, Take 1 tablet by mouth., Disp: , Rfl:     metFORMIN (GLUCOPHAGE) 500 MG tablet, , Disp: , Rfl:     metoprolol tartrate (LOPRESSOR) 25 MG tablet, PLEASE SEE ATTACHED FOR DETAILED DIRECTIONS, Disp: , Rfl:     rosuvastatin (CRESTOR) 10 MG tablet, , Disp: , Rfl:     SUMAtriptan (IMITREX)  "100 MG tablet, Take 1 tablet by mouth Every 2 (Two) Hours As Needed for Migraine., Disp: , Rfl:     Past Medical History:   Diagnosis Date    Anxiety and depression     Arthritis     Back problem     Bladder infection     Disease of thyroid gland     Extremity pain     Inguinal hernia     Migraine headache     PONV (postoperative nausea and vomiting)     Wears glasses     Worsening headaches       Past Surgical History:   Procedure Laterality Date    BUNIONECTOMY  2008     SECTION  2001        HERNIA REPAIR      LUMBAR LAMINECTOMY DISCECTOMY DECOMPRESSION Right 2016    Procedure:  L4-5 LAMINECTOMY ;  Surgeon: Bonifacio Cuellar MD;  Location: [facility];  Service:     THORACIC DISCECTOMY  2014    T10-T11 Disc and fusion with cage and plating.    THYROIDECTOMY      TONSILLECTOMY  1988    TUBAL ABDOMINAL LIGATION  2001        UMBILICAL HERNIA REPAIR       Family History   Problem Relation Age of Onset    No Known Problems Mother     No Known Problems Father      Social History     Socioeconomic History    Marital status:    Tobacco Use    Smoking status: Former     Packs/day: 1.00     Years: 10.00     Additional pack years: 0.00     Total pack years: 10.00     Types: Cigarettes     Quit date: 2005     Years since quittin.2     Passive exposure: Past    Smokeless tobacco: Never   Vaping Use    Vaping Use: Never used   Substance and Sexual Activity    Alcohol use: Yes     Comment: Moderately    Drug use: No    Sexual activity: Defer       Objective     Vital Signs:  /90   Pulse 96   Ht 162.6 cm (64\")   Wt 78.5 kg (173 lb)   SpO2 97%   BMI 29.70 kg/m²   Estimated body mass index is 29.7 kg/m² as calculated from the following:    Height as of this encounter: 162.6 cm (64\").    Weight as of this encounter: 78.5 kg (173 lb).         Physical Exam  Vitals reviewed.   Constitutional:       General: She is not in acute distress.     Appearance: Normal " appearance.   HENT:      Head: Normocephalic and atraumatic.      Mouth/Throat:      Mouth: Mucous membranes are moist.   Eyes:      Conjunctiva/sclera: Conjunctivae normal.   Neck:      Vascular: No carotid bruit.   Cardiovascular:      Rate and Rhythm: Normal rate and regular rhythm.      Pulses: Normal pulses.      Heart sounds: Normal heart sounds. No murmur heard.  Pulmonary:      Effort: Pulmonary effort is normal. No respiratory distress.      Breath sounds: Normal breath sounds. No wheezing or rhonchi.   Abdominal:      General: Abdomen is flat.      Palpations: Abdomen is soft.   Musculoskeletal:      Cervical back: Normal range of motion and neck supple.      Right lower leg: No edema.      Left lower leg: No edema.   Skin:     General: Skin is warm and dry.      Coloration: Skin is not jaundiced.   Neurological:      General: No focal deficit present.      Mental Status: She is alert and oriented to person, place, and time. Mental status is at baseline.      GCS: GCS eye subscore is 4. GCS verbal subscore is 5. GCS motor subscore is 6.      Cranial Nerves: No cranial nerve deficit.      Motor: No weakness.      Gait: Gait normal.   Psychiatric:         Mood and Affect: Mood and affect normal. Mood is not anxious.         Speech: Speech normal.         Behavior: Behavior normal.           Labs reviewed: CBC, CMP, TSH and Recent hospitalization notes reviewed: EKGs and physician notes      ECG 12 Lead    Date/Time: 12/28/2023 5:11 PM  Performed by: Rocio Lemus MD    Authorized by: Rocio Lemus MD  Comparison: compared with previous ECG from 12/10/2023  Similar to previous ECG  Rhythm: sinus rhythm  Rate: normal  Conduction: conduction normal  ST Segments: ST segments normal  T Waves: T waves normal  QRS axis: normal  Other: no other findings    Clinical impression: normal ECG           Assessment and Plan     Diagnoses and all orders for this visit:    1. 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  Continue metoprolol.  Declines ablation referral at this time.  Seems like it was provoked.  Will watch for unprovoked episodes.  Orders:  -     Adult Transthoracic Echo Complete W/ Cont if Necessary Per Protocol; Future  -     ECG 12 Lead    2. Hypertension, essential  Comments:  Patient's report that it is high for her today.  If it increases in future appointments we can up her beta-blocker.        Recommendations: Report if any new/changing symptoms immediately      Follow Up  Return in about 6 weeks (around 2/8/2024) for Next scheduled follow up with echo.    Rocio Lemus MD   12/28/2023     Please note that this explicitly excludes time spent on other separate billable services such as performing procedures or test interpretation, when applicable.    This note was created using dictation software which occasionally transcribes nonsensical phrases. Please contact the provider if any clarification is needed.

## 2024-03-12 ENCOUNTER — TELEPHONE (OUTPATIENT)
Dept: CARDIOLOGY | Facility: CLINIC | Age: 54
End: 2024-03-12
Payer: COMMERCIAL

## 2024-03-12 NOTE — TELEPHONE ENCOUNTER
Dr. Resendez office is requesting cardiac clearance for egd and colonoscopy, she was last seen on 12/28/23, she does not have a date yet.    Fax:  [phone]

## 2024-03-13 NOTE — TELEPHONE ENCOUNTER
Dr. Lemus can you confirm to reschedule this echo after echo follow up visit for cardiac clearance?